# Patient Record
Sex: MALE | Race: BLACK OR AFRICAN AMERICAN | NOT HISPANIC OR LATINO | Employment: FULL TIME | URBAN - METROPOLITAN AREA
[De-identification: names, ages, dates, MRNs, and addresses within clinical notes are randomized per-mention and may not be internally consistent; named-entity substitution may affect disease eponyms.]

---

## 2023-06-09 ENCOUNTER — TELEPHONE (OUTPATIENT)
Dept: URGENT CARE | Facility: CLINIC | Age: 48
End: 2023-06-09
Payer: MEDICAID

## 2023-06-09 NOTE — TELEPHONE ENCOUNTER
History provided by  from [facility].    On 06/01/23 around 14:30:    Nikos Armstrong is a 47 y.o. male who presents to  for seizures. Patient was working across the street at [facility] when he began to feel like ants were crawling on him. Shortly after, he had a seizure. EMS was called. However, they were delayed and unsure of a ETA. During this time, patient had another seizure.  presents to  pleaing for help because EMS has no eta.  picked up patient from [facility] and drove him across the parking lot to the urgent care. Patient attempted to get out of the vehicle and had another grand mal seizure in the parking lot. Myself and Dejon Coats MA assisted patient in the parking lot. I moved patient to lateral decubitus position and stabilized his head. He had expiratory wheezes in all lung fields. Seizure lasted 4-5 minutes. In this moment, the  is very irate and upset that EMS has not showed up. The fire department arrived and helped patient into a wheelchair. The fire department inquired about cancelling EMS. I dismissed the question. Patient was then transported into the urgent care to exam room 1. I was told he has a history of seizures but questionable if he is compliant with his medications. There were multiple attempts to get patient's information which became unsuccessful. Patient then had another grand mal seizure. He was helped to the floor, positioned in lateral decubitus, and his head was stabilized by myself and a . He continued to have wheezing in all lung fields. He is tachycardic and O2 saturation is <95%. Patient was placed on 2L of oxygen. After oxygen was placed, O2 saturation improved to 98%. Seizure lasted 5-6 minutes. Attempts to speak to patient during the post-ictal phase was unsuccessful. We were unable to obtain any personal information. The  was no longer present. Patient was monitored with 2L of oxygen until EMS  arrival. EMS arrived, patient was loaded into the ambulance. EMS was questioned as to which hospital patient was being transported to. They stated they were unsure at the time. A [employer] employee sat in the lobby with patient. However, she did not know his name or who he was.

## 2023-06-13 ENCOUNTER — DOCUMENTATION ONLY (OUTPATIENT)
Dept: URGENT CARE | Facility: CLINIC | Age: 48
End: 2023-06-13
Payer: MEDICAID

## 2023-06-13 NOTE — PROGRESS NOTES
History provided by  from [facility].     On 06/01/23 around 14:30:     Nikos Armstrong is a 47 y.o. male who presents to  for seizures. Patient was working across the street at [facility] when he began to feel like ants were crawling on him. Shortly after, he had a seizure. EMS was called. However, they were delayed and unsure of a ETA. During this time, patient had another seizure.  presents to  pleaing for help because EMS has no eta.  picked up patient from [facility] and drove him across the parking lot to the urgent care. Patient attempted to get out of the vehicle and had another grand mal seizure in the parking lot. Myself and Dejon Coats MA assisted patient in the parking lot. I moved patient to lateral decubitus position and stabilized his head. He had expiratory wheezes in all lung fields. Seizure lasted 4-5 minutes. In this moment, the  is very irate and upset that EMS has not showed up. The fire department arrived and helped patient into a wheelchair. The fire department inquired about cancelling EMS. I dismissed the question. Patient was then transported into the urgent care to exam room 1. I was told he has a history of seizures but questionable if he is compliant with his medications. There were multiple attempts to get patient's information which became unsuccessful. Patient then had another grand mal seizure. He was helped to the floor, positioned in lateral decubitus, and his head was stabilized by myself and a . He continued to have wheezing in all lung fields. He is tachycardic and O2 saturation is <95%. Patient was placed on 2L of oxygen. After oxygen was placed, O2 saturation improved to 98%. Seizure lasted 5-6 minutes. Attempts to speak to patient during the post-ictal phase was unsuccessful. We were unable to obtain any personal information. The  was no longer present. Patient was monitored with 2L of oxygen until EMS  arrival. EMS arrived, patient was loaded into the ambulance. EMS was questioned as to which hospital patient was being transported to. They stated they were unsure at the time. A [employer] employee sat in the lobby with patient. However, she did not know his name or who he was.